# Patient Record
Sex: MALE | Race: WHITE
[De-identification: names, ages, dates, MRNs, and addresses within clinical notes are randomized per-mention and may not be internally consistent; named-entity substitution may affect disease eponyms.]

---

## 2017-09-09 ENCOUNTER — HOSPITAL ENCOUNTER (EMERGENCY)
Dept: HOSPITAL 12 - ER | Age: 82
LOS: 1 days | Discharge: HOME | End: 2017-09-10
Payer: MEDICARE

## 2017-09-09 VITALS — HEIGHT: 67 IN | WEIGHT: 162 LBS | BODY MASS INDEX: 25.43 KG/M2
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
## 2017-09-09 DIAGNOSIS — Z95.1: ICD-10-CM
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
## 2017-09-09 DIAGNOSIS — I50.9: ICD-10-CM

## 2017-09-09 DIAGNOSIS — Z79.82: ICD-10-CM

## 2017-09-09 DIAGNOSIS — I48.91: ICD-10-CM

## 2017-09-09 DIAGNOSIS — K21.9: ICD-10-CM
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
## 2017-09-09 DIAGNOSIS — F03.90: ICD-10-CM

## 2017-09-09 DIAGNOSIS — Z96.652: ICD-10-CM

## 2017-09-09 DIAGNOSIS — M10.9: ICD-10-CM
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
## 2017-09-09 DIAGNOSIS — S89.92XA: Primary | ICD-10-CM

## 2017-09-09 PROCEDURE — A4663 DIALYSIS BLOOD PRESSURE CUFF: HCPCS

## 2017-09-09 PROCEDURE — 99284 EMERGENCY DEPT VISIT MOD MDM: CPT

## 2017-09-09 PROCEDURE — 29505 APPLICATION LONG LEG SPLINT: CPT

## 2017-09-09 PROCEDURE — 73564 X-RAY EXAM KNEE 4 OR MORE: CPT

## 2017-09-10 VITALS — DIASTOLIC BLOOD PRESSURE: 89 MMHG | SYSTOLIC BLOOD PRESSURE: 135 MMHG

## 2017-09-10 NOTE — NUR
Patient discharged to home in stable conditon.  Written and verbal after care 
instructions given. 

Patient verbalizes understanding of instructions.

PATIENT LEFT VIA WHEELCHAIR AND CAREGIVER.

## 2018-09-29 ENCOUNTER — HOSPITAL ENCOUNTER (INPATIENT)
Dept: HOSPITAL 12 - ER | Age: 83
LOS: 1 days | Discharge: LEFT BEFORE DISCHARGE | DRG: 308 | End: 2018-09-30
Payer: MEDICARE

## 2018-09-29 VITALS — BODY MASS INDEX: 27.71 KG/M2 | WEIGHT: 172.44 LBS | HEIGHT: 66 IN

## 2018-09-29 DIAGNOSIS — N39.0: ICD-10-CM

## 2018-09-29 DIAGNOSIS — E44.1: ICD-10-CM

## 2018-09-29 DIAGNOSIS — K59.00: ICD-10-CM

## 2018-09-29 DIAGNOSIS — I70.0: ICD-10-CM

## 2018-09-29 DIAGNOSIS — E78.5: ICD-10-CM

## 2018-09-29 DIAGNOSIS — E86.0: ICD-10-CM

## 2018-09-29 DIAGNOSIS — R35.0: ICD-10-CM

## 2018-09-29 DIAGNOSIS — N40.1: ICD-10-CM

## 2018-09-29 DIAGNOSIS — I48.91: Primary | ICD-10-CM

## 2018-09-29 DIAGNOSIS — K21.9: ICD-10-CM

## 2018-09-29 DIAGNOSIS — Z79.51: ICD-10-CM

## 2018-09-29 DIAGNOSIS — M10.9: ICD-10-CM

## 2018-09-29 DIAGNOSIS — R33.9: ICD-10-CM

## 2018-09-29 DIAGNOSIS — Z96.652: ICD-10-CM

## 2018-09-29 DIAGNOSIS — Z79.01: ICD-10-CM

## 2018-09-29 DIAGNOSIS — R79.89: ICD-10-CM

## 2018-09-29 DIAGNOSIS — G93.41: ICD-10-CM

## 2018-09-29 DIAGNOSIS — Z98.2: ICD-10-CM

## 2018-09-29 DIAGNOSIS — F41.9: ICD-10-CM

## 2018-09-29 DIAGNOSIS — Z95.1: ICD-10-CM

## 2018-09-29 DIAGNOSIS — Z79.899: ICD-10-CM

## 2018-09-29 DIAGNOSIS — R55: ICD-10-CM

## 2018-09-29 PROCEDURE — A4663 DIALYSIS BLOOD PRESSURE CUFF: HCPCS

## 2018-09-30 VITALS — DIASTOLIC BLOOD PRESSURE: 79 MMHG | SYSTOLIC BLOOD PRESSURE: 145 MMHG

## 2018-09-30 VITALS — DIASTOLIC BLOOD PRESSURE: 75 MMHG | SYSTOLIC BLOOD PRESSURE: 137 MMHG

## 2018-09-30 LAB
ALP SERPL-CCNC: 77 U/L (ref 50–136)
ALP SERPL-CCNC: 84 U/L (ref 50–136)
ALT SERPL W/O P-5'-P-CCNC: 39 U/L (ref 16–63)
ALT SERPL W/O P-5'-P-CCNC: 42 U/L (ref 16–63)
APPEARANCE UR: CLEAR
AST SERPL-CCNC: 23 U/L (ref 15–37)
AST SERPL-CCNC: 27 U/L (ref 15–37)
BASOPHILS # BLD AUTO: 0 K/UL (ref 0–8)
BASOPHILS # BLD AUTO: 0 K/UL (ref 0–8)
BASOPHILS NFR BLD AUTO: 0.3 % (ref 0–2)
BASOPHILS NFR BLD AUTO: 0.4 % (ref 0–2)
BILIRUB DIRECT SERPL-MCNC: 0.1 MG/DL (ref 0–0.2)
BILIRUB SERPL-MCNC: 0.4 MG/DL (ref 0.2–1)
BILIRUB SERPL-MCNC: 0.4 MG/DL (ref 0.2–1)
BILIRUB UR QL STRIP: NEGATIVE
BUN SERPL-MCNC: 21 MG/DL (ref 7–18)
BUN SERPL-MCNC: 26 MG/DL (ref 7–18)
CHLORIDE SERPL-SCNC: 106 MMOL/L (ref 98–107)
CHLORIDE SERPL-SCNC: 108 MMOL/L (ref 98–107)
CO2 SERPL-SCNC: 24 MMOL/L (ref 21–32)
CO2 SERPL-SCNC: 25 MMOL/L (ref 21–32)
COLOR UR: YELLOW
CREAT SERPL-MCNC: 1 MG/DL (ref 0.6–1.3)
CREAT SERPL-MCNC: 1.2 MG/DL (ref 0.6–1.3)
DEPRECATED SQUAMOUS URNS QL MICRO: (no result) /HPF
EOSINOPHIL # BLD AUTO: 0.1 K/UL (ref 0–0.7)
EOSINOPHIL # BLD AUTO: 0.1 K/UL (ref 0–0.7)
EOSINOPHIL NFR BLD AUTO: 0.7 % (ref 0–7)
EOSINOPHIL NFR BLD AUTO: 0.8 % (ref 0–7)
GLUCOSE SERPL-MCNC: 109 MG/DL (ref 74–106)
GLUCOSE SERPL-MCNC: 197 MG/DL (ref 74–106)
GLUCOSE UR STRIP-MCNC: NEGATIVE MG/DL
HCT VFR BLD AUTO: 40.9 % (ref 36.7–47.1)
HCT VFR BLD AUTO: 41.2 % (ref 36.7–47.1)
HGB BLD-MCNC: 13.9 G/DL (ref 12.5–16.3)
HGB BLD-MCNC: 14 G/DL (ref 12.5–16.3)
HGB UR QL STRIP: NEGATIVE
KETONES UR STRIP-MCNC: NEGATIVE MG/DL
LEUKOCYTE ESTERASE UR QL STRIP: (no result)
LIPASE SERPL-CCNC: 225 U/L (ref 73–393)
LIPASE SERPL-CCNC: 268 U/L (ref 73–393)
LYMPHOCYTES # BLD AUTO: 1.4 K/UL (ref 20–40)
LYMPHOCYTES # BLD AUTO: 1.7 K/UL (ref 20–40)
LYMPHOCYTES NFR BLD AUTO: 13.4 % (ref 20.5–51.5)
LYMPHOCYTES NFR BLD AUTO: 20.1 % (ref 20.5–51.5)
MAGNESIUM SERPL-MCNC: 2 MG/DL (ref 1.8–2.4)
MCH RBC QN AUTO: 30 UUG (ref 23.8–33.4)
MCH RBC QN AUTO: 30.4 UUG (ref 23.8–33.4)
MCHC RBC AUTO-ENTMCNC: 34 G/DL (ref 32.5–36.3)
MCHC RBC AUTO-ENTMCNC: 34 G/DL (ref 32.5–36.3)
MCV RBC AUTO: 88.6 FL (ref 73–96.2)
MCV RBC AUTO: 89.3 FL (ref 73–96.2)
MONOCYTES # BLD AUTO: 0.8 K/UL (ref 2–10)
MONOCYTES # BLD AUTO: 0.8 K/UL (ref 2–10)
MONOCYTES NFR BLD AUTO: 7.4 % (ref 0–11)
MONOCYTES NFR BLD AUTO: 9 % (ref 0–11)
NEUTROPHILS # BLD AUTO: 5.8 K/UL (ref 1.8–8.9)
NEUTROPHILS # BLD AUTO: 8.1 K/UL (ref 1.8–8.9)
NEUTROPHILS NFR BLD AUTO: 69.8 % (ref 38.5–71.5)
NEUTROPHILS NFR BLD AUTO: 78.1 % (ref 38.5–71.5)
NITRITE UR QL STRIP: NEGATIVE
PH UR STRIP: 5 [PH] (ref 5–8)
PHOSPHATE SERPL-MCNC: 4.3 MG/DL (ref 2.5–4.9)
PLATELET # BLD AUTO: 141 K/UL (ref 152–348)
PLATELET # BLD AUTO: 155 K/UL (ref 152–348)
POTASSIUM SERPL-SCNC: 3.7 MMOL/L (ref 3.5–5.1)
POTASSIUM SERPL-SCNC: 4 MMOL/L (ref 3.5–5.1)
PROT UR QL STRIP: NEGATIVE
RBC # BLD AUTO: 4.58 MIL/UL (ref 4.06–5.63)
RBC # BLD AUTO: 4.65 MIL/UL (ref 4.06–5.63)
RBC #/AREA URNS HPF: (no result) /HPF (ref 0–3)
SP GR UR STRIP: 1.02 (ref 1–1.03)
TSH SERPL DL<=0.005 MIU/L-ACNC: 3.02 MIU/ML (ref 0.36–3.74)
UROBILINOGEN UR STRIP-MCNC: 0.2 E.U./DL
WBC # BLD AUTO: 10.4 K/UL (ref 3.6–10.2)
WBC # BLD AUTO: 8.3 K/UL (ref 3.6–10.2)
WBC #/AREA URNS HPF: (no result) /HPF
WBC #/AREA URNS HPF: (no result) /HPF (ref 0–3)
WS STN SPEC: 7.1 G/DL (ref 6.4–8.2)
WS STN SPEC: 7.1 G/DL (ref 6.4–8.2)